# Patient Record
Sex: FEMALE | Race: BLACK OR AFRICAN AMERICAN
[De-identification: names, ages, dates, MRNs, and addresses within clinical notes are randomized per-mention and may not be internally consistent; named-entity substitution may affect disease eponyms.]

---

## 2019-06-15 ENCOUNTER — HOSPITAL ENCOUNTER (EMERGENCY)
Dept: HOSPITAL 72 - EMR | Age: 34
Discharge: HOME | End: 2019-06-15
Payer: MEDICAID

## 2019-06-15 VITALS — SYSTOLIC BLOOD PRESSURE: 137 MMHG | DIASTOLIC BLOOD PRESSURE: 91 MMHG

## 2019-06-15 VITALS — HEIGHT: 64 IN | WEIGHT: 150 LBS | BODY MASS INDEX: 25.61 KG/M2

## 2019-06-15 VITALS — DIASTOLIC BLOOD PRESSURE: 91 MMHG | SYSTOLIC BLOOD PRESSURE: 137 MMHG

## 2019-06-15 DIAGNOSIS — K04.7: Primary | ICD-10-CM

## 2019-06-15 DIAGNOSIS — Z91.041: ICD-10-CM

## 2019-06-15 DIAGNOSIS — Z91.013: ICD-10-CM

## 2019-06-15 PROCEDURE — 99282 EMERGENCY DEPT VISIT SF MDM: CPT

## 2019-06-15 NOTE — NUR
ED Nurse Note:

Patient cleared for discharge, patient verbalized understanding of discharge 
instructions. Id band removed. Patient escorted to discharge desk prior to 
departure with all belongings.

## 2019-06-15 NOTE — EMERGENCY ROOM REPORT
History of Present Illness


General


Chief Complaint:  Toothache


Source:  Patient





Present Illness


HPI


This a 33-year-old female with no past medical history.  She presents with 

complaint of dental pain.  Onset for last 2 days.  Some swelling also to the 

right upper face area.  No nausea no vomiting pain is 9 out of 10.  Worse with 

eating.  No drainage.


Allergies:  


Coded Allergies:  


     SHELLFISH DERIVED (Verified  Allergy, Intermediate, Anaphylaxis, 8/14/15)


Uncoded Allergies:  


     Iodoine (Adverse Reaction, Mild, Itching, 8/15/15)





Patient History


Past Medical History:  see triage record, old chart reviewed


Past Surgical History:  appy


Pertinent Family History:  none


Social History:  Denies: smoking


Last Menstrual Period:  05/29/19


Pregnant Now:  No


Immunizations:  other


Reviewed Nursing Documentation:  PMH: Agreed; PSxH: Agreed





Nursing Documentation-PMH


Past Medical History:  No Stated History


Hx Cardiac Problems:  No


Hx Cancer:  No


Hx Gastrointestinal Problems:  No


Hx Neurological Problems:  No





Review of Systems


Eye:  Denies: eye pain, blurred vision


ENT:  Denies: ear pain, nose congestion, throat swelling


Respiratory:  Denies: cough, shortness of breath


Cardiovascular:  Denies: chest pain, palpitations


Gastrointestinal:  Denies: abdominal pain, diarrhea, nausea, vomiting


Musculoskeletal:  Denies: back pain, joint pain


Skin:  Denies: rash


Neurological:  Denies: headache, numbness


Endocrine:  Denies: increased thirst, increased urine


Hematologic/Lymphatic:  Denies: easy bruising


All Other Systems:  negative except mentioned in HPI





Physical Exam





Vital Signs








  Date Time  Temp Pulse Resp B/P (MAP) Pulse Ox O2 Delivery O2 Flow Rate FiO2


 


6/15/19 22:07 99.3 95 16 137/91 (106) 97 Room Air  





Vitals unremarkable


Sp02 EP Interpretation:  reviewed, normal


General Appearance:  well appearing, no apparent distress, alert


Head:  normocephalic, atraumatic


Eyes:  bilateral eye PERRL, bilateral eye EOMI


ENT:  hearing grossly normal, normal pharynx, other - Right face lateral to the 

nostril shows some edema and tenderness.  No mass.  Poor dentition.  Decay over 

the first premolar.


Neck:  full range of motion, supple, no meningismus


Respiratory:  chest non-tender, lungs clear, normal breath sounds


Cardiovascular #1:  regular rate, rhythm, no murmur


Gastrointestinal:  normal bowel sounds, non tender, no mass, no organomegaly, 

no bruit, non-distended


Musculoskeletal:  back normal, gait/station normal, normal range of motion


Psychiatric:  mood/affect normal


Skin:  warm/dry





Medical Decision Making


Diagnostic Impression:  


 Primary Impression:  


 Dental abscess


ER Course


Patient with early dental abscess.  Nothing to be I&D at this moment.  

Antibiotics given here.  She will need to see a dentist ASAP. No Evidence of 

deep infection or cellulitis.





Last Vital Signs








  Date Time  Temp Pulse Resp B/P (MAP) Pulse Ox O2 Delivery O2 Flow Rate FiO2


 


6/15/19 22:17 99.3 78 16 137/91 97 Room Air  








Status:  improved


Disposition:  HOME, SELF-CARE


Condition:  Stable


Scripts


Ibuprofen* (MOTRIN*) 600 Mg Tablet


600 MG ORAL THREE TIMES A DAY, #30 TAB 0 Refills


   Prov: Nomi Arboleda MD         6/15/19 


Hydrocodone/Acetaminophen 5-325* (HYDROCODONE/ACETAMINOPHEN 5-325*) 1 Each 

Tablet


1 TAB ORAL Q6H PRN for For Pain, #10 TAB 0 Refills


   Prov: Nomi Arboleda MD         6/15/19 


Clindamycin Hcl (CLINDAMYCIN HCL) 300 Mg Capsule


300 MG ORAL THREE TIMES A DAY, #21 CAP


   Prov: Nomi Arboleda MD         6/15/19


Patient Instructions:  Dental Pain





Additional Instructions:  


Follow-up with dentist SARAP.  Return if worse.











Nomi Arboleda MD Shahid 15, 2019 22:19